# Patient Record
Sex: FEMALE | Race: OTHER | HISPANIC OR LATINO | URBAN - METROPOLITAN AREA
[De-identification: names, ages, dates, MRNs, and addresses within clinical notes are randomized per-mention and may not be internally consistent; named-entity substitution may affect disease eponyms.]

---

## 2018-02-13 ENCOUNTER — EMERGENCY (EMERGENCY)
Facility: HOSPITAL | Age: 63
LOS: 1 days | Discharge: HOME | End: 2018-02-13
Attending: EMERGENCY MEDICINE
Payer: COMMERCIAL

## 2018-02-13 VITALS
TEMPERATURE: 98 F | HEART RATE: 73 BPM | DIASTOLIC BLOOD PRESSURE: 83 MMHG | SYSTOLIC BLOOD PRESSURE: 150 MMHG | OXYGEN SATURATION: 99 % | RESPIRATION RATE: 18 BRPM

## 2018-02-13 VITALS — HEIGHT: 66 IN | WEIGHT: 160.06 LBS

## 2018-02-13 DIAGNOSIS — Z98.89 OTHER SPECIFIED POSTPROCEDURAL STATES: Chronic | ICD-10-CM

## 2018-02-13 DIAGNOSIS — Z90.49 ACQUIRED ABSENCE OF OTHER SPECIFIED PARTS OF DIGESTIVE TRACT: Chronic | ICD-10-CM

## 2018-02-13 PROCEDURE — 73562 X-RAY EXAM OF KNEE 3: CPT | Mod: 26,RT

## 2018-02-13 PROCEDURE — T1013: CPT

## 2018-02-13 PROCEDURE — 73562 X-RAY EXAM OF KNEE 3: CPT

## 2018-02-13 PROCEDURE — 99284 EMERGENCY DEPT VISIT MOD MDM: CPT

## 2018-02-13 PROCEDURE — 93971 EXTREMITY STUDY: CPT

## 2018-02-13 PROCEDURE — 99284 EMERGENCY DEPT VISIT MOD MDM: CPT | Mod: 25

## 2018-02-13 PROCEDURE — 93971 EXTREMITY STUDY: CPT | Mod: 26,RT

## 2018-02-13 RX ORDER — IBUPROFEN 200 MG
800 TABLET ORAL ONCE
Qty: 0 | Refills: 0 | Status: COMPLETED | OUTPATIENT
Start: 2018-02-13 | End: 2018-02-13

## 2018-02-13 RX ORDER — IBUPROFEN 200 MG
1 TABLET ORAL
Qty: 20 | Refills: 0 | OUTPATIENT
Start: 2018-02-13 | End: 2018-02-17

## 2018-02-13 RX ORDER — CYCLOBENZAPRINE HYDROCHLORIDE 10 MG/1
1 TABLET, FILM COATED ORAL
Qty: 10 | Refills: 0 | OUTPATIENT
Start: 2018-02-13 | End: 2018-02-17

## 2018-02-13 RX ADMIN — Medication 800 MILLIGRAM(S): at 08:53

## 2018-02-13 NOTE — ED STATDOCS - OBJECTIVE STATEMENT
63 y/o F with a PSHx of cholecystectomy and no significant PMHx, presents to the ED c/o R knee pain for 2 days. Pt has increased pain with ambulation and ROM. She denies fall, trauma, and heavy lifting. Pt says she is mostly sitting at work and denies lots of standing and walking throughout the day. Has had similar episodes in the past. Pt has seen her doctor who put her on anti-inflammatory medication without any testing for arthritis (?). Denies CP, SOB, nausea, vomiting, fever, chill, and rash. No other acute complaints at this time.

## 2018-02-13 NOTE — ED STATDOCS - NS_ ATTENDINGSCRIBEDETAILS _ED_A_ED_FT
IThe history, relevant review of systems, past medical and surgical history, medical decision making, and physical examination was documented by the scribe in my presence and I attest to the accuracy of the documentation.

## 2018-02-13 NOTE — ED STATDOCS - CONDUCTED A DETAILED DISCUSSION WITH PATIENT AND/OR GUARDIAN REGARDING, MDM
need for outpatient follow-up/radiology results/return to ED if symptoms worsen, persist or questions arise/us and xr/lab results

## 2018-03-28 ENCOUNTER — OTHER (OUTPATIENT)
Age: 63
End: 2018-03-28

## 2018-03-28 PROBLEM — Z00.00 ENCOUNTER FOR PREVENTIVE HEALTH EXAMINATION: Status: ACTIVE | Noted: 2018-03-28

## 2018-04-18 ENCOUNTER — OTHER (OUTPATIENT)
Age: 63
End: 2018-04-18

## 2018-04-18 DIAGNOSIS — M25.569 PAIN IN UNSPECIFIED KNEE: ICD-10-CM

## 2018-04-26 ENCOUNTER — APPOINTMENT (OUTPATIENT)
Dept: ORTHOPEDIC SURGERY | Facility: CLINIC | Age: 63
End: 2018-04-26

## 2019-11-08 ENCOUNTER — MED ADMIN CHARGE (OUTPATIENT)
Age: 64
End: 2019-11-08

## 2019-11-08 ENCOUNTER — LABORATORY RESULT (OUTPATIENT)
Age: 64
End: 2019-11-08

## 2019-11-08 ENCOUNTER — APPOINTMENT (OUTPATIENT)
Dept: FAMILY MEDICINE | Facility: CLINIC | Age: 64
End: 2019-11-08
Payer: MEDICARE

## 2019-11-08 VITALS
HEART RATE: 74 BPM | HEIGHT: 63 IN | OXYGEN SATURATION: 98 % | BODY MASS INDEX: 32.07 KG/M2 | WEIGHT: 181 LBS | DIASTOLIC BLOOD PRESSURE: 88 MMHG | TEMPERATURE: 98 F | SYSTOLIC BLOOD PRESSURE: 150 MMHG

## 2019-11-08 DIAGNOSIS — Z76.89 PERSONS ENCOUNTERING HEALTH SERVICES IN OTHER SPECIFIED CIRCUMSTANCES: ICD-10-CM

## 2019-11-08 DIAGNOSIS — Z78.9 OTHER SPECIFIED HEALTH STATUS: ICD-10-CM

## 2019-11-08 DIAGNOSIS — Z00.00 ENCOUNTER FOR GENERAL ADULT MEDICAL EXAMINATION W/OUT ABNORMAL FINDINGS: ICD-10-CM

## 2019-11-08 PROCEDURE — G0402 INITIAL PREVENTIVE EXAM: CPT

## 2019-11-08 PROCEDURE — 36415 COLL VENOUS BLD VENIPUNCTURE: CPT

## 2019-11-08 PROCEDURE — 90472 IMMUNIZATION ADMIN EACH ADD: CPT

## 2019-11-08 PROCEDURE — G0009: CPT

## 2019-11-08 PROCEDURE — 90732 PPSV23 VACC 2 YRS+ SUBQ/IM: CPT

## 2019-11-08 PROCEDURE — 90662 IIV NO PRSV INCREASED AG IM: CPT

## 2019-11-08 NOTE — REVIEW OF SYSTEMS
[Nail Changes] : nail changes [Patient Intake Form Reviewed] : Patient intake form was reviewed [Hair Changes] : hair changes

## 2019-11-08 NOTE — PHYSICAL EXAM
[Well Nourished] : well nourished [No Acute Distress] : no acute distress [Well Developed] : well developed [Well-Appearing] : well-appearing [Normal Sclera/Conjunctiva] : normal sclera/conjunctiva [PERRL] : pupils equal round and reactive to light [EOMI] : extraocular movements intact [Normal Outer Ear/Nose] : the outer ears and nose were normal in appearance [No JVD] : no jugular venous distention [Normal Oropharynx] : the oropharynx was normal [No Lymphadenopathy] : no lymphadenopathy [Supple] : supple [Thyroid Normal, No Nodules] : the thyroid was normal and there were no nodules present [No Accessory Muscle Use] : no accessory muscle use [No Respiratory Distress] : no respiratory distress  [Clear to Auscultation] : lungs were clear to auscultation bilaterally [Normal S1, S2] : normal S1 and S2 [Normal Rate] : normal rate  [Regular Rhythm] : with a regular rhythm [No Murmur] : no murmur heard [No Carotid Bruits] : no carotid bruits [No Varicosities] : no varicosities [Pedal Pulses Present] : the pedal pulses are present [No Abdominal Bruit] : a ~M bruit was not heard ~T in the abdomen [No Palpable Aorta] : no palpable aorta [No Edema] : there was no peripheral edema [Non Tender] : non-tender [Soft] : abdomen soft [No Extremity Clubbing/Cyanosis] : no extremity clubbing/cyanosis [No Masses] : no abdominal mass palpated [No HSM] : no HSM [Non-distended] : non-distended [Normal Bowel Sounds] : normal bowel sounds [Normal Posterior Cervical Nodes] : no posterior cervical lymphadenopathy [Normal Anterior Cervical Nodes] : no anterior cervical lymphadenopathy [No CVA Tenderness] : no CVA  tenderness [Grossly Normal Strength/Tone] : grossly normal strength/tone [No Joint Swelling] : no joint swelling [No Spinal Tenderness] : no spinal tenderness [No Focal Deficits] : no focal deficits [No Rash] : no rash [Coordination Grossly Intact] : coordination grossly intact [Deep Tendon Reflexes (DTR)] : deep tendon reflexes were 2+ and symmetric [Normal Affect] : the affect was normal [Normal Gait] : normal gait [Normal Insight/Judgement] : insight and judgment were intact

## 2019-11-08 NOTE — COUNSELING
[Behavioral health counseling provided] : Behavioral health counseling provided [Fall prevention counseling provided] : Fall prevention counseling provided [Good understanding] : Patient has a good understanding of lifestyle changes and steps needed to achieve self management goal [None] : None

## 2019-11-08 NOTE — HEALTH RISK ASSESSMENT
[Good] : ~his/her~  mood as  good [No] : In the past 12 months have you used drugs other than those required for medical reasons? No [No falls in past year] : Patient reported no falls in the past year [0] : 2) Feeling down, depressed, or hopeless: Not at all (0) [HIV test declined] : HIV test declined [Hepatitis C test declined] : Hepatitis C test declined [Language] : difficulty with language [Reasoning] : difficulty with reasoning [None] : None [With Family] : lives with family [Employed] : employed [] :  [# Of Children ___] : has [unfilled] children [Fully functional (bathing, dressing, toileting, transferring, walking, feeding)] : Fully functional (bathing, dressing, toileting, transferring, walking, feeding) [Feels Safe at Home] : Feels safe at home [Fully functional (using the telephone, shopping, preparing meals, housekeeping, doing laundry, using] : Fully functional and needs no help or supervision to perform IADLs (using the telephone, shopping, preparing meals, housekeeping, doing laundry, using transportation, managing medications and managing finances) [Smoke Detector] : smoke detector [Seat Belt] :  uses seat belt [With Patient/Caregiver] : With Patient/Caregiver [Designated Healthcare Proxy] : Designated healthcare proxy [Name: ___] : Health Care Proxy's Name: [unfilled]  [Relationship: ___] : Relationship: [unfilled] [Handling Complex Tasks] : difficulty handling complex tasks [Learning/Retaining New Information] : difficulty learning/retaining new information [] : No [Change in mental status noted] : No change in mental status noted [Spatial Ability and Orientation] : denies difficulty with spatial ability and orientation [Behavior] : denies difficulty with behavior [Sexually Active] : not sexually active [Reports changes in hearing] : Reports no changes in hearing [Reports changes in vision] : Reports no changes in vision [Reports changes in dental health] : Reports no changes in dental health [MammogramDate] : ? [PapSmearDate] : ? 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 [AdvancecareDate] : 11/8/19

## 2019-11-08 NOTE — HISTORY OF PRESENT ILLNESS
[de-identified] : 63 y/o HF originally from [facility] presents today to establish anew PCP.\par Pt with no significant past medical hx. Has multiple concerns: Toenail chnages, itchy mole in back.\par \par Previous PCP: Dr Nath, Dr Serna. [FreeTextEntry1] : Establish a new PCP

## 2019-11-08 NOTE — ASSESSMENT
[FreeTextEntry1] : 63 y/o HF presents today to establish anew PCP:\par \par HCM:\par -blood and UA today\par -HIV/HC refused\par \par Mammo; referral\par \par Colonoscopy: referral\par \par Bone scan: referral\par \par Gyn: referral\par \par Immunizations:\par -Flu and Pneumococcal today\par \par Nevus in back:\par -Derm eval\par \par Onychomycosis:\par -LFT's before Rx med.

## 2019-11-11 LAB
25(OH)D3 SERPL-MCNC: 17.4 NG/ML
ALBUMIN SERPL ELPH-MCNC: 4.5 G/DL
ALP BLD-CCNC: 102 U/L
ALT SERPL-CCNC: 38 U/L
ANION GAP SERPL CALC-SCNC: 13 MMOL/L
APPEARANCE: CLEAR
AST SERPL-CCNC: 29 U/L
BASOPHILS # BLD AUTO: 0.06 K/UL
BASOPHILS NFR BLD AUTO: 0.7 %
BILIRUB SERPL-MCNC: 0.3 MG/DL
BILIRUBIN URINE: NEGATIVE
BLOOD URINE: NEGATIVE
BUN SERPL-MCNC: 17 MG/DL
CALCIUM SERPL-MCNC: 10 MG/DL
CHLORIDE SERPL-SCNC: 105 MMOL/L
CHOLEST SERPL-MCNC: 214 MG/DL
CHOLEST/HDLC SERPL: 4.3 RATIO
CO2 SERPL-SCNC: 24 MMOL/L
COLOR: YELLOW
CREAT SERPL-MCNC: 0.77 MG/DL
EOSINOPHIL # BLD AUTO: 0.12 K/UL
EOSINOPHIL NFR BLD AUTO: 1.4 %
GLUCOSE QUALITATIVE U: NEGATIVE
GLUCOSE SERPL-MCNC: 100 MG/DL
HCT VFR BLD CALC: 44.3 %
HDLC SERPL-MCNC: 50 MG/DL
HGB BLD-MCNC: 14.3 G/DL
IMM GRANULOCYTES NFR BLD AUTO: 0.3 %
KETONES URINE: NEGATIVE
LDLC SERPL CALC-MCNC: 123 MG/DL
LEUKOCYTE ESTERASE URINE: ABNORMAL
LYMPHOCYTES # BLD AUTO: 2.25 K/UL
LYMPHOCYTES NFR BLD AUTO: 25.7 %
MAN DIFF?: NORMAL
MCHC RBC-ENTMCNC: 28.4 PG
MCHC RBC-ENTMCNC: 32.3 GM/DL
MCV RBC AUTO: 88.1 FL
MONOCYTES # BLD AUTO: 0.7 K/UL
MONOCYTES NFR BLD AUTO: 8 %
NEUTROPHILS # BLD AUTO: 5.58 K/UL
NEUTROPHILS NFR BLD AUTO: 63.9 %
NITRITE URINE: NEGATIVE
PH URINE: 6
PLATELET # BLD AUTO: 265 K/UL
POTASSIUM SERPL-SCNC: 4.2 MMOL/L
PROT SERPL-MCNC: 7 G/DL
PROTEIN URINE: NORMAL
RBC # BLD: 5.03 M/UL
RBC # FLD: 12.7 %
SODIUM SERPL-SCNC: 142 MMOL/L
SPECIFIC GRAVITY URINE: 1.03
TRIGL SERPL-MCNC: 206 MG/DL
TSH SERPL-ACNC: 2.39 UIU/ML
UROBILINOGEN URINE: NORMAL
WBC # FLD AUTO: 8.74 K/UL

## 2019-11-27 ENCOUNTER — APPOINTMENT (OUTPATIENT)
Dept: RADIOLOGY | Facility: CLINIC | Age: 64
End: 2019-11-27

## 2019-12-27 ENCOUNTER — APPOINTMENT (OUTPATIENT)
Dept: GASTROENTEROLOGY | Facility: CLINIC | Age: 64
End: 2019-12-27

## 2020-01-23 ENCOUNTER — RESULT REVIEW (OUTPATIENT)
Age: 65
End: 2020-01-23

## 2020-01-23 ENCOUNTER — APPOINTMENT (OUTPATIENT)
Dept: DERMATOLOGY | Facility: CLINIC | Age: 65
End: 2020-01-23
Payer: MEDICARE

## 2020-01-23 PROCEDURE — 11102 TANGNTL BX SKIN SINGLE LES: CPT

## 2020-01-23 PROCEDURE — 99202 OFFICE O/P NEW SF 15 MIN: CPT | Mod: 25

## 2021-06-07 ENCOUNTER — EMERGENCY (EMERGENCY)
Facility: HOSPITAL | Age: 66
LOS: 1 days | Discharge: HOME | End: 2021-06-07
Attending: EMERGENCY MEDICINE
Payer: COMMERCIAL

## 2021-06-07 VITALS
WEIGHT: 160.06 LBS | RESPIRATION RATE: 18 BRPM | OXYGEN SATURATION: 99 % | HEIGHT: 66 IN | TEMPERATURE: 98 F | DIASTOLIC BLOOD PRESSURE: 93 MMHG | SYSTOLIC BLOOD PRESSURE: 165 MMHG | HEART RATE: 85 BPM

## 2021-06-07 DIAGNOSIS — Z90.49 ACQUIRED ABSENCE OF OTHER SPECIFIED PARTS OF DIGESTIVE TRACT: Chronic | ICD-10-CM

## 2021-06-07 DIAGNOSIS — Z98.89 OTHER SPECIFIED POSTPROCEDURAL STATES: Chronic | ICD-10-CM

## 2021-06-07 PROCEDURE — 99283 EMERGENCY DEPT VISIT LOW MDM: CPT | Mod: 25

## 2021-06-07 PROCEDURE — 99284 EMERGENCY DEPT VISIT MOD MDM: CPT

## 2021-06-07 PROCEDURE — 73030 X-RAY EXAM OF SHOULDER: CPT

## 2021-06-07 PROCEDURE — 73030 X-RAY EXAM OF SHOULDER: CPT | Mod: 26,LT

## 2021-06-07 RX ORDER — IBUPROFEN 200 MG
600 TABLET ORAL ONCE
Refills: 0 | Status: COMPLETED | OUTPATIENT
Start: 2021-06-07 | End: 2021-06-07

## 2021-06-07 RX ORDER — METHOCARBAMOL 500 MG/1
1500 TABLET, FILM COATED ORAL ONCE
Refills: 0 | Status: COMPLETED | OUTPATIENT
Start: 2021-06-07 | End: 2021-06-07

## 2021-06-07 RX ORDER — METHOCARBAMOL 500 MG/1
1 TABLET, FILM COATED ORAL
Qty: 10 | Refills: 0
Start: 2021-06-07 | End: 2021-06-11

## 2021-06-07 RX ORDER — IBUPROFEN 200 MG
1 TABLET ORAL
Qty: 12 | Refills: 0
Start: 2021-06-07 | End: 2021-06-09

## 2021-06-07 RX ADMIN — Medication 600 MILLIGRAM(S): at 12:59

## 2021-06-07 RX ADMIN — METHOCARBAMOL 1500 MILLIGRAM(S): 500 TABLET, FILM COATED ORAL at 12:59

## 2021-06-07 NOTE — ED PROVIDER NOTE - CLINICAL SUMMARY MEDICAL DECISION MAKING FREE TEXT BOX
shoulder pain  no sings of infection, likely musculoskeletal  x-ray , analgesics , follow up ortho    Pt walked out of ED prior to receiving results and d/c instructions

## 2021-06-07 NOTE — ED PROVIDER NOTE - OBJECTIVE STATEMENT
66 y/o female presents c/o left shoulder pain x one month. PT reports she received the 2nd covid vaccine one month ago. She reports pain to the left shoulder area that is worse with movement of the left shoulder. Denies any known trauma.

## 2021-06-07 NOTE — ED PROVIDER NOTE - CARE PROVIDER_API CALL
Rodrigo Bland)  Orthopaedic Surgery  [address] [place] [address]  Phone: [phone]  Fax: [phone]  Follow Up Time:

## 2021-06-07 NOTE — ED PROVIDER NOTE - NSFOLLOWUPINSTRUCTIONS_ED_ALL_ED_FT
Sprain    A sprain is a stretch or tear in one of the tough, fiber-like tissues (ligaments) in your body. This is caused by an injury to the area such as a twisting mechanism. Symptoms include pain, swelling, or bruising. Rest that area over the next several days and slowly resume activity when tolerated. Ice can help with swelling and pain.     SEEK IMMEDIATE MEDICAL CARE IF YOU HAVE ANY OF THE FOLLOWING SYMPTOMS: worsening pain, inability to move that body part, numbness or tingling.     Please follow up with orthopedics within one week.

## 2021-06-07 NOTE — ED PROVIDER NOTE - PHYSICAL EXAMINATION
Left shoulder: + mild tenderness over lateal shoulder, no erythema, no swelling, no induration, + FROM of shoulder with pain with ROM, NVI distally

## 2021-07-19 ENCOUNTER — TRANSCRIPTION ENCOUNTER (OUTPATIENT)
Age: 66
End: 2021-07-19

## 2021-07-28 ENCOUNTER — APPOINTMENT (OUTPATIENT)
Dept: FAMILY MEDICINE | Facility: CLINIC | Age: 66
End: 2021-07-28

## 2021-07-29 ENCOUNTER — APPOINTMENT (OUTPATIENT)
Dept: ORTHOPEDIC SURGERY | Facility: CLINIC | Age: 66
End: 2021-07-29
Payer: MEDICARE

## 2021-07-29 VITALS
HEIGHT: 63 IN | BODY MASS INDEX: 32.78 KG/M2 | DIASTOLIC BLOOD PRESSURE: 83 MMHG | WEIGHT: 185 LBS | HEART RATE: 81 BPM | SYSTOLIC BLOOD PRESSURE: 161 MMHG

## 2021-07-29 DIAGNOSIS — M75.42 IMPINGEMENT SYNDROME OF LEFT SHOULDER: ICD-10-CM

## 2021-07-29 PROCEDURE — 99204 OFFICE O/P NEW MOD 45 MIN: CPT

## 2021-07-29 PROCEDURE — 99214 OFFICE O/P EST MOD 30 MIN: CPT

## 2021-07-29 PROCEDURE — 73030 X-RAY EXAM OF SHOULDER: CPT | Mod: LT

## 2021-07-29 RX ORDER — MELOXICAM 15 MG/1
15 TABLET ORAL
Qty: 30 | Refills: 1 | Status: ACTIVE | COMMUNITY
Start: 2021-07-29 | End: 1900-01-01

## 2021-07-30 RX ORDER — PREDNISONE 50 MG/1
50 TABLET ORAL
Qty: 5 | Refills: 0 | Status: ACTIVE | COMMUNITY
Start: 2021-07-19

## 2021-07-30 NOTE — HISTORY OF PRESENT ILLNESS
[Pain Location] : pain [] : left shoulder [Worsening] : worsening [___ mths] : [unfilled] month(s) ago [de-identified] : Patient is a RHD female, who presents with c/o left shoulder pain for 2 months Post Covid-Vaccine. Most of the pain is located at lateral aspect of the shoulder. Patient describes the pain as sharp/throbbing/aching.  Patient reports she is unable to sleep on the left side. The shoulder aches at night and wakes up from sleep. There is pain with certain ROM, but patient denies loss of ROM.  Patient has pain donning a jacket.  There is pain when reaching above the head, for example, to reach into a cabinet. There has been no trauma. Patient has tried ice/heat, which helps/doesn't help.  Patient has taken Prednisone for pain which she was prescribed for 5 days when she went to Urgent Care on 7/19/21. The prednisone has helped. Patient denies/reports similar sxs in the past. Patient denies any weakness, tingling, numbness, neck pain.\par

## 2021-07-30 NOTE — DISCUSSION/SUMMARY
[de-identified] : X-rays were reviewed with patient.\par Discussed the pathophysiology of impingement syndrome.  Discussed the role of HEP, PT, subacromial steroid injection.  Patient was instructed on HEP, use of tension bands, importance of ROM.\par An Rx for Meloxicam was sent to her pharmacy. An Rx for PT was generated. \par She will f/u in 1 month.\par  Oriented - self; Oriented - place; Oriented - time

## 2021-07-30 NOTE — PHYSICAL EXAM
[Normal] : Gait: normal [UE] : Sensory: Intact in bilateral upper extremities [Rad] : radial 2+ and symmetric bilaterally [de-identified] : Alert & oriented to person place & time\par No acute distress. Normal affect.\par Normocephalic atraumatic\par Extraocular muscles intact \par Normal respiratory rate and effort, no nasal flaring or use of accessory muscles \par Skin warm and well perfused, no edema, no lymphadenopathy\par \par Left Shoulder:\par no deformity, ecchymosis, erythema\par tender to palpation at greater tuberosity\par tender to palpation at proximal biceps tendon\par forward elevation = 170\par abduction = 160\par internal rotation = thumb to T12\par external rotation = 55\par + Neer impingement\par + Perez impingement\par negative Speeds test\par 5/5 power\par FROM at elbow, wrist and hand with 5/5 motor\par \par Right Shoulder:\par no deformity\par nontender to palpation \par forward elevation = 175\par abduction = 180\par internal rotation = T12 \par external rotation = 55\par negative impingement\par negative Speeds test\par 5/5 power\par  [de-identified] : X-rays obtained of the left shoulder reveal mild degenerative change at the AC joint, lateral acromion. Mild degenerative glenohumeral degenerative change. No obvious fractures, dislocations, or osseous lesions seen.\par

## 2021-09-02 ENCOUNTER — APPOINTMENT (OUTPATIENT)
Dept: ORTHOPEDIC SURGERY | Facility: CLINIC | Age: 66
End: 2021-09-02
Payer: MEDICARE

## 2021-09-02 VITALS
HEART RATE: 74 BPM | WEIGHT: 186 LBS | HEIGHT: 63 IN | BODY MASS INDEX: 32.96 KG/M2 | DIASTOLIC BLOOD PRESSURE: 70 MMHG | SYSTOLIC BLOOD PRESSURE: 144 MMHG

## 2021-09-02 PROCEDURE — 99213 OFFICE O/P EST LOW 20 MIN: CPT

## 2021-11-01 ENCOUNTER — APPOINTMENT (OUTPATIENT)
Dept: ORTHOPEDIC SURGERY | Facility: CLINIC | Age: 66
End: 2021-11-01

## 2021-11-01 DIAGNOSIS — M25.512 PAIN IN LEFT SHOULDER: ICD-10-CM

## 2021-11-17 NOTE — ED PROVIDER NOTE - NS ED MD DISPO DISCHARGE
Home
Detail Level: Simple
Additional Notes: Patient consent was obtained to proceed with the visit and recommended plan of care after discussion of all risks and benefits, including the risks of COVID-19 exposure.

## 2022-06-25 ENCOUNTER — NON-APPOINTMENT (OUTPATIENT)
Age: 67
End: 2022-06-25

## 2022-07-15 ENCOUNTER — EMERGENCY (EMERGENCY)
Facility: HOSPITAL | Age: 67
LOS: 1 days | Discharge: HOME | End: 2022-07-15
Attending: EMERGENCY MEDICINE
Payer: MEDICARE

## 2022-07-15 VITALS
HEART RATE: 78 BPM | DIASTOLIC BLOOD PRESSURE: 84 MMHG | WEIGHT: 145.06 LBS | OXYGEN SATURATION: 99 % | RESPIRATION RATE: 16 BRPM | SYSTOLIC BLOOD PRESSURE: 149 MMHG | HEIGHT: 66 IN | TEMPERATURE: 98 F

## 2022-07-15 VITALS
SYSTOLIC BLOOD PRESSURE: 155 MMHG | OXYGEN SATURATION: 99 % | RESPIRATION RATE: 17 BRPM | DIASTOLIC BLOOD PRESSURE: 84 MMHG | HEART RATE: 87 BPM

## 2022-07-15 DIAGNOSIS — Z90.49 ACQUIRED ABSENCE OF OTHER SPECIFIED PARTS OF DIGESTIVE TRACT: Chronic | ICD-10-CM

## 2022-07-15 DIAGNOSIS — Z98.89 OTHER SPECIFIED POSTPROCEDURAL STATES: Chronic | ICD-10-CM

## 2022-07-15 PROCEDURE — 99285 EMERGENCY DEPT VISIT HI MDM: CPT | Mod: 57

## 2022-07-15 PROCEDURE — 24650 CLTX RDL HEAD/NCK FX WO MNPJ: CPT | Mod: 54,RT

## 2022-07-15 PROCEDURE — 93005 ELECTROCARDIOGRAM TRACING: CPT

## 2022-07-15 PROCEDURE — 70450 CT HEAD/BRAIN W/O DYE: CPT | Mod: 26,MA

## 2022-07-15 PROCEDURE — 93010 ELECTROCARDIOGRAM REPORT: CPT

## 2022-07-15 PROCEDURE — 73080 X-RAY EXAM OF ELBOW: CPT

## 2022-07-15 PROCEDURE — 99284 EMERGENCY DEPT VISIT MOD MDM: CPT | Mod: 25

## 2022-07-15 PROCEDURE — 70450 CT HEAD/BRAIN W/O DYE: CPT | Mod: MA

## 2022-07-15 PROCEDURE — 29105 APPLICATION LONG ARM SPLINT: CPT | Mod: RT

## 2022-07-15 PROCEDURE — 73090 X-RAY EXAM OF FOREARM: CPT

## 2022-07-15 PROCEDURE — 73060 X-RAY EXAM OF HUMERUS: CPT

## 2022-07-15 RX ORDER — OXYCODONE HYDROCHLORIDE 5 MG/1
5 TABLET ORAL ONCE
Refills: 0 | Status: DISCONTINUED | OUTPATIENT
Start: 2022-07-15 | End: 2022-07-15

## 2022-07-15 RX ORDER — ONDANSETRON 8 MG/1
4 TABLET, FILM COATED ORAL ONCE
Refills: 0 | Status: DISCONTINUED | OUTPATIENT
Start: 2022-07-15 | End: 2022-07-20

## 2022-07-15 RX ORDER — ACETAMINOPHEN 500 MG
975 TABLET ORAL ONCE
Refills: 0 | Status: COMPLETED | OUTPATIENT
Start: 2022-07-15 | End: 2022-07-15

## 2022-07-15 RX ADMIN — Medication 975 MILLIGRAM(S): at 20:34

## 2022-07-15 RX ADMIN — Medication 975 MILLIGRAM(S): at 21:49

## 2022-07-15 RX ADMIN — OXYCODONE HYDROCHLORIDE 5 MILLIGRAM(S): 5 TABLET ORAL at 23:42

## 2022-07-15 NOTE — ED PROVIDER NOTE - NSFOLLOWUPINSTRUCTIONS_ED_ALL_ED_FT
Please keep splint dry  Follow up with orthopedics within 1 week      Radial Head Fracture    Bones and nerves of the arm, with a close-up showing a type 3 fracture in the radial head.    A radial head fracture is a break in one of the bones of the forearm (radius), near the elbow joint. There are two bones in the forearm. The radius, or radial bone, is the bone on the side of the thumb.    There are different types of radial head fractures. The type is determined by the amount of movement (displacement) of bones from their normal positions:  •Type 1. This is a small fracture in which the bone pieces remain together (nondisplaced fracture).      •Type 2. The fracture is moderate, and bone pieces are slightly displaced.      •Type 3. There are multiple fractures and displaced bone pieces.        What are the causes?    This condition is usually caused by falling and landing on an outstretched arm.      What increases the risk?    You are more likely to develop this condition if you:  •Are an older female.      •Participate in sports that are more likely to cause falls while running or jumping.      •Have weak bones (osteoporosis).        What are the signs or symptoms?    Symptoms of this condition include:  •Swelling of the elbow joint.      •Pain and difficulty when moving the elbow joint, such as when straightening the elbow or rotating the forearm.        How is this diagnosed?    This condition is diagnosed based on your medical history and a physical exam. You may have X-rays to confirm the type of fracture.      How is this treated?    Treatment for this condition includes resting, icing, and raising (elevating) the injured area above the level of your heart. You may be given medicines to help relieve pain.    Treatment varies depending on the type of fracture you have.  •For a type 1 fracture, you may be given a splint or sling to keep your arm and elbow from moving for several days.      •For a type 2 fracture, you may be given a splint or sling to keep your arm and elbow from moving for several days. If the displacement is more severe, you may need surgery.      •For a type 3 fracture, you will usually need surgery to have bone pieces removed. The entire radial head may need to be removed if the damage is severe. The fracture will be stabilized with a splint and sling.        Follow these instructions at home:    Medicines     •Take over-the-counter and prescription medicines only as told by your health care provider.    •Ask your health care provider if the medicine prescribed to you:  •Requires you to avoid driving or using machinery.    •Can cause constipation. You may need to take these actions to prevent or treat constipation:  •Drink enough fluid to keep your urine pale yellow.      •Take over-the-counter or prescription medicines.      •Eat foods that are high in fiber, such as beans, whole grains, and fresh fruits and vegetables.      •Limit foods that are high in fat and processed sugars, such as fried or sweet foods.          If you have a removable splint or sling:     •Wear the splint or sling as told by your health care provider. Remove it only as told by your health care provider.      •Check the skin around the splint or sling every day. Tell your health care provider about any concerns.      •Loosen the splint or sling if your fingers tingle, become numb, or turn cold and blue.      •Keep it clean and dry.      If you have a nonremovable splint:     • Do not put pressure on any part of the splint until it is fully hardened. This may take several hours.      • Do not stick anything inside the splint to scratch your skin. Doing that increases your risk of infection.      •Check the skin around the splint every day. Tell your health care provider about any concerns.      •You may put lotion on dry skin around the edges of the splint. Do not put lotion on the skin underneath the splint.      •Keep it clean and dry.      Bathing     • Do not take baths, swim, or use a hot tub until your health care provider approves. Ask your health care provider if you may take showers. You may only be allowed to take sponge baths.    •If the splint or sling is not waterproof:  •Do not let it get wet.      •Cover it with a watertight covering when you take a bath or shower.          Managing pain, stiffness, and swelling   Bag of ice on a towel on the skin. •If directed, put ice on the injured area. To do this:  •If you have a removable splint or sling, remove it as told by your health care provider.      •Put ice in a plastic bag.      •Place a towel between your skin and the bag or between your splint and the bag.      •Leave the ice on for 20 minutes, 2–3 times a day.      •Remove the ice if your skin turns bright red. This is very important. If you cannot feel pain, heat, or cold, you have a greater risk of damage to the area.        •Move your fingers often to reduce stiffness and swelling.      •Raise (elevate) the injured area above the level of your heart while you are sitting or lying down.      Activity     •Ask your health care provider when it is safe to drive if you have a splint or sling on your arm.      • Do not lift anything that is heavier than 10 lb (4.5 kg), or the limit that you are told, until your health care provider says that it is safe.      •Return to your normal activities as told by your health care provider. Ask your health care provider what activities are safe for you.      •Do exercises as told by your health care provider or physical therapist.      General instructions     • Do not use any products that contain nicotine or tobacco. These products include cigarettes, chewing tobacco, and vaping devices, such as e-cigarettes. If you need help quitting, ask your health care provider.      •Keep all follow-up visits. This is important.        Contact a health care provider if:    •You have problems with your splint.      •You have pain or swelling that gets worse.        Get help right away if:    •You have severe pain, especially if the pain changes significantly or suddenly.      •You have fluid or a bad smell coming from your splint.      •Your hand or fingers get cold or turn pale or blue.      •You lose feeling in any part of your hand or arm.        Summary    •A radial head fracture is a break in one of the bones in your forearm (radius), near the elbow joint.      •This condition is usually caused by falling and landing on an outstretched arm.      •This condition may be treated with rest, ice, elevation, pain medicines, a splint or sling, and surgery. Treatment will vary depending on the type of fracture you have.      This information is not intended to replace advice given to you by your health care provider. Make sure you discuss any questions you have with your health care provider.

## 2022-07-15 NOTE — ED PROVIDER NOTE - ATTENDING APP SHARED VISIT CONTRIBUTION OF CARE
I, Tariq Carrera, have personally performed a face to face diagnostic evaluation on this patient. I have reviewed the JODY note and agree with the history, exam and plan of care, except as noted.    65 yo F p/w mechanical fall on right arm in the bathroom. no head injury. right elbow swollen. held in flexed position. limited ROM secondary to pain. 2+ peripheral pulse. < 2 sec cap refill. xray showed radial head fracture. splint applied by PA. ortho follow up.

## 2022-07-15 NOTE — ED PROVIDER NOTE - PHYSICAL EXAMINATION
Gen: No acute distress, non toxic  HEENT: Mucous membranes moist, pink conjunctivae, PERRL, EOMI  CV: RRR, nl s1/s2.  Resp: CTAB, normal rate and effort  GI: Abdomen soft, NT, ND. No rebound, no guarding  : No CVAT  Neuro: A&O x 3, CNII-XII grossly intact, moving all 4 extremities  MSK: No midline spine tenderness to palpation. +Tenderness right elbow, limited ROM, no ttp R shoulder, wrist or hand. sensation intact throughout, 2+ distal pulses  Skin: No rashes. intact and perfused.

## 2022-07-15 NOTE — ED ADULT NURSE NOTE - NS ED NURSE LEVEL OF CONSCIOUSNESS MENTAL STATUS
[address] [facility]  DVT Prophylaxis and Vaccine Status  Work List  Mandatory for all patients      Patient must be on both Chemical prophylaxis and Mechanical prophylaxis.  If chemical/mechanical prophylaxis is not ordered, the physician must document a reason for not using prophylaxis     Chemical Prophylaxis  Is patient on chemical prophylaxis: Yes  If no chemical prophylaxis Is a order in for No Chemical VTE prophylaxisNo  If no was the physician notified not applicable      Mechanical Prophylaxis  Is patient on mechanical prophylaxis, intermittent pneumatic compression device: Yes  If no was the physician notified not applicable        Pneumonia Vaccine  Vaccine indicated:  Vaccination refused  If indicated was the vaccine given: not applicable    Influenza Vaccine (applicable from October through March):  Vaccine indicated: Up-to-date  If indicated was the vaccine given: not applicable    Patient Education  Education completed on DVT prophylaxis: yes Awake/Alert/Cooperative

## 2022-07-15 NOTE — ED ADULT NURSE NOTE - ADDITIONAL LOCATION
"Kuldip Castaneda is a 42 y.o. male who presents for  evaluation of   Chief Complaint   Patient presents with   • Diabetes       Referring provider    No referring provider defined for this encounter.    Primary Care Provider    Aura Carrillo MD    Duration 5 years    Timing - Diabetes is Constant    Quality -  Glucose elevated    Severity -  high    Complications - none    Current symptoms/problems  Polyuria, weakness    Alleviating Factors: Compliance      Side Effects  none    Current diet  in general, a \"healthy\" diet      Current exercise tired ,not exercising     Current monitoring regimen: home blood tests - 3 times daily  Home blood sugar records: 200 to 300    Hypoglycemia none    Past Medical History:   Diagnosis Date   • Anxiety    • Biliary dyskinesia    • Blood in feces         • Chest pain    • Chronic cholecystitis without calculus     postoperative      • Depressive disorder    • Epigastric pain    • Essential hypertension    • Gastro-esophageal reflux disease with esophagitis    • Generalized anxiety disorder    • Genital warts     large left groin      • Glaucoma suspect     suspicious disc cupping      • Hashimoto's thyroiditis    • Hematochezia    • History of echocardiogram 01/15/2016    Mild concentric LV hypertrophy with normal left atrial and aortic root size. LV systolic function is overall well preserved with EF 55-60%. Mitral valve mildly thickened with adequate opening.   • Hypercholesterolemia    • Hyperlipemia    • Hypertensive disorder    • Lipoma of anterior chest wall     left   • Major depressive disorder    • Microscopic hematuria    • Morbid obesity (CMS/HCC)    • Multiple acquired skin tags     in groin   • Nausea and vomiting    • Obesity    • Pain in pelvis    • Painful urging to urinate    • Panic disorder    • Psychiatric illness    • Psychosis    • Right upper quadrant pain    • Schizoaffective disorder (CMS/HCC)    • Transient visual loss     resolved, prob BS " "elevation      • Type 2 diabetes mellitus (CMS/HCC)     not on medications at this time    • Visual disturbance    • Vitamin D deficiency      Family History   Problem Relation Age of Onset   • Depression Mother    • Schizophrenia Father         or Bipolar Disorder, admitted to [facility]   • Heart attack Father      Social History   Substance Use Topics   • Smoking status: Former Smoker   • Smokeless tobacco: Never Used   • Alcohol use No      Comment: Tried cocaine, crack, meth, thc all when he was young at parties.  Nothing in about 2 decades         Current Outpatient Prescriptions:   •  ALPRAZolam (XANAX) 2 MG tablet, Take 1 tablet by mouth At Night As Needed for Anxiety., Disp: 30 tablet, Rfl: 2  •  atorvastatin (LIPITOR) 20 MG tablet, Take 1 tablet by mouth Every Night., Disp: 30 tablet, Rfl: 5  •  B-D 3CC LUER-JUAN DIEGO SYR 25GX1/2\" 25G X 1-1/2\" 3 ML misc, Inject 1 mL into the shoulder, thigh, or buttocks Every 30 (Thirty) Days., Disp: 1 each, Rfl: 5  •  cyanocobalamin 1000 MCG/ML injection, Inject 1 ml (1,000 mcg) intramuscularly (into the muscle) every 14 days., Disp: 2 mL, Rfl: 3  •  EPINEPHrine (EPIPEN 2-SWATHI) 0.3 MG/0.3ML solution auto-injector injection, As dir., Disp: 1 each, Rfl: 2  •  esomeprazole (NEXIUM) 40 MG capsule, Take 1 capsule by mouth Every Morning Before Breakfast., Disp: 30 capsule, Rfl: 5  •  hydrochlorothiazide (HYDRODIURIL) 25 MG tablet, Take 1 tablet by mouth Daily., Disp: 30 tablet, Rfl: 5  •  hydrOXYzine (VISTARIL) 50 MG capsule, Take 1 capsule by mouth 4 (Four) Times a Day As Needed for Itching or Anxiety., Disp: 120 capsule, Rfl: 5  •  Insulin Glargine (TOUJEO MAX SOLOSTAR) 300 UNIT/ML solution pen-injector, Inject 75 Units under the skin into the appropriate area as directed every morning., Disp: 9 mL, Rfl: 11  •  Insulin Lispro (HUMALOG KWIKPEN) 200 UNIT/ML solution pen-injector, Inject 5 units per 15 grams of carbohydrate up to 30 units under the skin 3 (three) times a day with " "meals., Disp: 12 mL, Rfl: 11  •  Insulin Pen Needle 31G X 6 MM misc, Use to inject insulin 4 (four) times a day., Disp: 120 each, Rfl: 11  •  SYNTHROID 50 MCG tablet, Take 1 tablet by mouth Every Morning., Disp: 30 tablet, Rfl: 5  •  Syringe/Needle, Disp, (B-D SYRINGE/NEEDLE 1CC/25GX5/8) 25G X 5/8\" 1 ML misc, FOR USE WITH VIT B-12 SHOT, Disp: 2 each, Rfl: 3  •  vitamin D (ERGOCALCIFEROL) 84478 units capsule capsule, Take 1 capsule by mouth 2 (Two) Times a Week., Disp: 8 capsule, Rfl: 5    Review of Systems    Review of Systems   Constitutional: Negative for activity change, appetite change, chills, diaphoresis, fatigue, fever and unexpected weight change.   HENT: Negative for congestion, dental problem, drooling, ear discharge, ear pain, facial swelling, mouth sores, postnasal drip, rhinorrhea, sinus pressure, sore throat, tinnitus, trouble swallowing and voice change.    Eyes: Negative for photophobia, pain, discharge, redness, itching and visual disturbance.   Respiratory: Negative for apnea, cough, choking, chest tightness, shortness of breath, wheezing and stridor.    Cardiovascular: Negative for chest pain, palpitations and leg swelling.   Gastrointestinal: Negative for abdominal distention, abdominal pain, constipation, diarrhea, nausea and vomiting.   Endocrine: Negative for cold intolerance, heat intolerance, polydipsia, polyphagia and polyuria.   Genitourinary: Negative for decreased urine volume, difficulty urinating, dysuria, flank pain, frequency, hematuria and urgency.   Musculoskeletal: Negative for arthralgias, back pain, gait problem, joint swelling, myalgias, neck pain and neck stiffness.   Skin: Negative for color change, pallor, rash and wound.   Allergic/Immunologic: Negative for immunocompromised state.   Neurological: Negative for dizziness, tremors, seizures, syncope, facial asymmetry, speech difficulty, weakness, light-headedness, numbness and headaches.   Hematological: Negative for " "adenopathy.   Psychiatric/Behavioral: Negative for agitation, behavioral problems, confusion, decreased concentration, dysphoric mood, hallucinations, self-injury, sleep disturbance and suicidal ideas. The patient is not nervous/anxious and is not hyperactive.         Objective:   /80   Pulse 87   Ht 182.9 cm (72\")   Wt (!) 139 kg (306 lb 3.2 oz)   SpO2 96%   BMI 41.53 kg/m²     Physical Exam   Constitutional: He is oriented to person, place, and time. He appears well-developed and well-nourished. He is cooperative.   HENT:   Head: Normocephalic and atraumatic.   Right Ear: External ear normal.   Left Ear: External ear normal.   Nose: Nose normal.   Mouth/Throat: Oropharynx is clear and moist. No oropharyngeal exudate.   Eyes: Pupils are equal, round, and reactive to light. Conjunctivae and EOM are normal. No scleral icterus. Right eye exhibits normal extraocular motion. Left eye exhibits normal extraocular motion.   Neck: Neck supple. No JVD present. No muscular tenderness present. No tracheal deviation, no edema and no erythema present. No thyromegaly present.   Cardiovascular: Normal rate, regular rhythm, normal heart sounds and intact distal pulses.  Exam reveals no gallop and no friction rub.    No murmur heard.  Pulmonary/Chest: Effort normal and breath sounds normal. No stridor. No respiratory distress. He has no decreased breath sounds. He has no wheezes. He has no rhonchi. He has no rales. He exhibits no tenderness.   Abdominal: Soft. Bowel sounds are normal. He exhibits no distension and no mass. There is no hepatomegaly. There is no tenderness. There is no rebound and no guarding. No hernia.   Musculoskeletal: Normal range of motion. He exhibits no edema, tenderness or deformity.   Lymphadenopathy:     He has no cervical adenopathy.   Neurological: He is alert and oriented to person, place, and time. He has normal reflexes. No cranial nerve deficit. He exhibits normal muscle tone. Coordination " normal.   Skin: Skin is warm. No rash noted. No erythema. No pallor.   Psychiatric: He has a normal mood and affect. His behavior is normal. Judgment and thought content normal.   Nursing note and vitals reviewed.      Lab Review            Assessment/Plan       ICD-10-CM ICD-9-CM   1. Type 2 diabetes mellitus with complication, unspecified long term insulin use status (CMS/[facility]) E11.8 250.90   2. Hypothyroidism due to Hashimoto's thyroiditis E03.8 244.8    E06.3 245.2   3. Vitamin D deficiency E55.9 268.9   4. Essential hypertension I10 401.9   5. B12 deficiency E53.8 266.2   6. Mixed hyperlipidemia E78.2 272.2   7. Hypogonadism in male E29.1 257.2   8. Depression, unspecified depression type F32.9 311       Glycemic Management:   Lab Results   Component Value Date    HGBA1C 13.8 (H) 08/27/2018    HGBA1C 6.02 (H) 07/12/2017    HGBA1C 6.42 (H) 06/08/2017     Lab Results   Component Value Date    GLUCOSE 225 (H) 08/30/2018    BUN 20 08/30/2018    CREATININE 1.22 08/30/2018    EGFRIFAFRI 79 08/30/2018    BCR 16.4 08/30/2018    K 3.8 08/30/2018    CO2 22.0 08/30/2018    CALCIUM 9.6 08/30/2018    ALBUMIN 4.40 08/29/2018    AST 39 08/29/2018    ALT 45 08/29/2018    ANIONGAP 12.0 08/30/2018     Lab Results   Component Value Date    WBC 3.09 (L) 08/29/2018    HGB 14.4 08/29/2018    HCT 39.5 08/29/2018    MCV 82.5 08/29/2018     08/29/2018       Keep Toujeo 75 units daily   Every week see what the average waking sugar has been    If that number is more than 130 , you can increase toujeo by 5 units up to 150 units   If you ever wake low ( less than 80 ) you back off 10 units and don't go up again       ===============================================      Keep humalog 5 units per 15 grams of carbohydrate     Plus    151-200: 3 units  201-250 : 6 units  251-300 : 9 units  Above 300 : 12 units    Every week you can look back and see if you have been using the sliding scale more than 50% of the time     If you have, then  increase the humalog by 1 = 6 units per 15 grams of carbohydrate , you can keep doing this up to 10 units per 15 grams of carbohydrate       ==================================================    -----------------    Start trulicity 0.75 mg weekly     If nausea , try to eat less, if vomiting, please stop     -----------------    In 2 weeks     Start     xigduo 5/1000 mg tabs    1 w bkfast , after 2 weeks increase to 2 w bkfast         Lipid Management  Lab Results   Component Value Date    CHOL 154 11/22/2017    CHOL 197 07/14/2017    CHOL 193 07/12/2017     Lab Results   Component Value Date    TRIG 105 11/22/2017    TRIG 87 07/14/2017    TRIG 68 07/12/2017     Lab Results   Component Value Date    HDL 35 11/22/2017    HDL 33 (L) 07/14/2017    HDL 31 (L) 07/12/2017     No components found for: LDLCALC  Lab Results   Component Value Date    LDL 98 11/22/2017     (H) 07/14/2017     (H) 07/12/2017     No results found for: LDLDIRECT     On atorvastatin 20 mg qhs before , discuss this next appt      Blood Pressure Management:    Vitals:    09/04/18 1152   BP: 132/80   Pulse: 87   SpO2: 96%     Lab Results   Component Value Date    GLUCOSE 225 (H) 08/30/2018    CALCIUM 9.6 08/30/2018     (L) 08/30/2018    K 3.8 08/30/2018    CO2 22.0 08/30/2018     08/30/2018    BUN 20 08/30/2018    CREATININE 1.22 08/30/2018    EGFRIFAFRI 79 08/30/2018    BCR 16.4 08/30/2018    ANIONGAP 12.0 08/30/2018         Controlled on vasotec in the past   Presently on hctz and at goal       Microvascular Complication Monitoring:          -----------    Last Microalbumin-Proteinuria Assessment    Lab Results   Component Value Date    MALBCRERATIO 54.9 (H) 06/08/2017    MALBCRERATIO 10 05/05/2016       Lab Results   Component Value Date    [facility] 16 05/05/2016    [facility] 45 01/18/2016       -----------      Neuropathy, none       Immunizations:          Preventive Care:      Not smoking       Weight Related:   Wt Readings  from Last 3 Encounters:   09/04/18 (!) 139 kg (306 lb 3.2 oz)   08/30/18 (!) 136 kg (300 lb 6.4 oz)   08/22/18 133 kg (293 lb)     Body mass index is 41.53 kg/m².        Diet interventions: moderate (500 kCal/d) deficit diet.      Bone Health    Lab Results   Component Value Date    CALCIUM 9.6 08/30/2018    PHOS 3.0 08/28/2018    DDJC42ES 44.1 06/08/2017       Thyroid Health    Controlled on 50 mcgs daily     Lab Results   Component Value Date    TSH 3.730 08/29/2018    TSH 4.390 11/22/2017    TSH 3.500 07/11/2017                 Other Diabetes Related Aspects       Lab Results   Component Value Date    [id number] 637 06/08/2017           Last celiac panel     Lab Results   Component Value Date    GDPABIGA 4 01/16/2016    TTRANSGLIGA <2 01/16/2016     01/16/2016     Male Hypogonadism          Lab Results   Component Value Date    TESTFRE 11.6 02/27/2017       Lab Results   Component Value Date    TESTOSTEROTT 538 06/08/2017    TESTOSTEROTT 328 (L) 02/27/2017    TESTOSTEROTT 224 (L) 10/07/2016       Lab Results   Component Value Date    HCT 39.5 08/29/2018    HCT 43.0 08/27/2018    HCT 42.0 11/22/2017       Lab Results   Component Value Date    PSA 0.6 06/08/2017    PSA 0.70 03/10/2015       No results found for: PROLACTIN    No results found for: ESTRADIOL                Was doing 100 mg every 10 days , reevaluate need     No sx of NAVA    No LUTS  Lab Results   Component Value Date    PSA 0.6 06/08/2017    PSA 0.70 03/10/2015       --    Dx of depression but at some point he was given dx of bipolar and schizoaffective , rx olanzapine that he is off of it    We need clarification , refer to psychiatry    I reviewed and summarized records from Aura Carrillo MD from 2017 and I reviewed / ordered labs.     Orders Placed This Encounter   Procedures   • CBC Auto Differential   • Comprehensive Metabolic Panel   • Hemoglobin A1c   • Lipid Panel   • Microalbumin / Creatinine Urine Ratio - Urine, Clean Catch   •  TSH   • Vitamin B12   • Vitamin D 25 Hydroxy   • Testosterone, Bioavailable (M)   • PSA Total, Reflex To Free   • POC Glucose         A copy of my note was sent to Aura Carrillo MD    Please see my above opinion and suggestions.      additional location...

## 2022-07-15 NOTE — ED ADULT NURSE NOTE - OBJECTIVE STATEMENT
Pt is a 66YOF who is here for an injury to her right elbow/arm, pt states she was at home bathing her grandchild, when she was cleaning up, she believes she slipped on the water on the floor and landed on her arm, pt states she "woke up" on the floor, states possible loc, denies any pain anywhere except her arm, no noticeable injury to her head or neck area. No pain on palpation. Pt is a&o4

## 2022-07-15 NOTE — ED PROVIDER NOTE - PATIENT PORTAL LINK FT
You can access the FollowMyHealth Patient Portal offered by [facility] by registering at the following website: [email or web address]. By joining [employer]’s FollowMyHealth portal, you will also be able to view your health information using other applications (apps) compatible with our system.

## 2022-07-15 NOTE — ED PROVIDER NOTE - OBJECTIVE STATEMENT
67 y/o F no pmhx presents c/o fall and right arm pain. pt was in bathroom giving niece a bath, turned and slipped and fell. ?LOC although denies hitting head. c/o right elbow pain worse with movement. denies any headache, n/v/d, numbness, weakness, chest pain, shortness of breath. no blood thinners.

## 2022-07-15 NOTE — ED PROVIDER NOTE - CARE PROVIDER_API CALL
Chip Roa (DO)  Orthopedics  [address] [place] [id number]  Phone: [phone]  Fax: [phone]  Follow Up Time:

## 2022-07-15 NOTE — ED PROVIDER NOTE - CLINICAL SUMMARY MEDICAL DECISION MAKING FREE TEXT BOX
pt with slip and fall with questionable LOC, c/o r elbow pain  denies CP/SOB  ekg, CT brain neg  Xrays +radial head fx, applied splint and shoulder sling and given orthopedic f/u

## 2022-07-15 NOTE — ED PROVIDER NOTE - NS ED ATTENDING STATEMENT MOD
This was a shared visit with the JODY. I reviewed and verified the documentation and independently performed the documented:

## 2022-07-16 PROCEDURE — 73080 X-RAY EXAM OF ELBOW: CPT | Mod: 26,RT

## 2022-07-16 PROCEDURE — 73090 X-RAY EXAM OF FOREARM: CPT | Mod: 26,LT

## 2022-07-16 PROCEDURE — 73060 X-RAY EXAM OF HUMERUS: CPT | Mod: 26,RT

## 2022-07-16 RX ORDER — OXYCODONE HYDROCHLORIDE 5 MG/1
1 TABLET ORAL
Qty: 8 | Refills: 0
Start: 2022-07-16

## 2022-07-16 RX ORDER — IBUPROFEN 200 MG
1 TABLET ORAL
Qty: 20 | Refills: 0
Start: 2022-07-16

## 2022-07-20 ENCOUNTER — APPOINTMENT (OUTPATIENT)
Dept: ORTHOPEDIC SURGERY | Facility: CLINIC | Age: 67
End: 2022-07-20

## 2022-07-20 VITALS
DIASTOLIC BLOOD PRESSURE: 85 MMHG | SYSTOLIC BLOOD PRESSURE: 143 MMHG | WEIGHT: 186 LBS | BODY MASS INDEX: 32.96 KG/M2 | HEIGHT: 63 IN | HEART RATE: 85 BPM

## 2022-07-20 PROCEDURE — 24650 CLTX RDL HEAD/NCK FX WO MNPJ: CPT | Mod: RT

## 2022-07-20 PROCEDURE — 99213 OFFICE O/P EST LOW 20 MIN: CPT | Mod: 57

## 2022-07-28 NOTE — ED ADULT NURSE NOTE - RESPIRATION RHYTHM, QM
Target Date Of Procedure (If Known)?: 07/28/2022 Additional History: Pt interested in botox and fillers\\nDenies h/o cold sores, neuromuscular diseases. regular

## 2022-08-02 ENCOUNTER — APPOINTMENT (OUTPATIENT)
Dept: ORTHOPEDIC SURGERY | Facility: CLINIC | Age: 67
End: 2022-08-02

## 2022-08-02 VITALS
HEART RATE: 75 BPM | DIASTOLIC BLOOD PRESSURE: 80 MMHG | SYSTOLIC BLOOD PRESSURE: 147 MMHG | BODY MASS INDEX: 32.96 KG/M2 | HEIGHT: 63 IN | WEIGHT: 186 LBS

## 2022-08-02 PROCEDURE — 73080 X-RAY EXAM OF ELBOW: CPT | Mod: RT

## 2022-08-02 PROCEDURE — 99024 POSTOP FOLLOW-UP VISIT: CPT

## 2022-08-19 ENCOUNTER — APPOINTMENT (OUTPATIENT)
Dept: ORTHOPEDIC SURGERY | Facility: CLINIC | Age: 67
End: 2022-08-19

## 2022-08-19 VITALS
DIASTOLIC BLOOD PRESSURE: 82 MMHG | HEIGHT: 63 IN | WEIGHT: 186 LBS | HEART RATE: 85 BPM | BODY MASS INDEX: 32.96 KG/M2 | SYSTOLIC BLOOD PRESSURE: 158 MMHG

## 2022-08-19 PROCEDURE — 73080 X-RAY EXAM OF ELBOW: CPT | Mod: RT

## 2022-08-19 PROCEDURE — 99024 POSTOP FOLLOW-UP VISIT: CPT

## 2022-08-19 NOTE — DISCUSSION/SUMMARY
[de-identified] : Assessment: 67-year-old female with right radial head fracture.\par \par Plan: I had a long discussion with the patient today regarding the nature of their diagnosis and treatment plan. We discussed the risks and benefits of no treatment as well as nonoperative and operative treatments.  I reviewed the patient's x-rays  today which revealed a minimally displaced radial head fracture and overall acceptable position, well healing.  At this time I am recommending that she begin formal physical therapy to work on range of motion.  She will avoid any heavy lifting, pushing or pulling with the right upper extremity to prevent further injury and displacement of the fracture.  She will follow-up in 4 weeks for repeat evaluation and we may begin gentle progressive strengthening at that time pending her x-rays.  Patient understands and agrees and all questions were answered.  Patient discussed and reviewed with Dr. Roa today.\par \par The patient verbalizes their understanding and agrees with the plan.  All questions were answered to their satisfaction.

## 2022-08-19 NOTE — HISTORY OF PRESENT ILLNESS
[de-identified] : 08/19/2022 : LEANNA SILVER  is a 67 year year old female who presents to the office for follow-up evaluation of her right elbow today.  She states in general she is feeling much better and has been compliant with her restrictions overall.  She states she still has some swelling and has been working on range of motion on her own and is overall doing much better and has minimal pain.  She states she has some stiffness with flexion and extension but is overall doing well.  She is here for routine follow-up today with her son.  She denies any numbness or tingling distally.  She has no other complaints today.\par \par 08/02/2022 : LEANNA SILVER  is a 66 year year old female who presents to the office for  follow-up valuation of her right elbow.  She states that since last office visit she has had improvement in her pain in her elbow.  She is been compliant with her restrictions in a sling and has avoided any heavy lifting with the right upper extremity.  She is here for routine follow-up today With her son.  She denies any numbness or tingling distally.  She has no other complaints today.\par \par 7/20/22: Leanna is a pleasant 66-year-old right-hand-dominant female who presents to the office today complaining of left shoulder pain for the past 2 to 3 months.  She denies any history of trauma.  Pain was activity related and alleviated by rest.  Hurting her to reach overhead or behind her.  It bothers her when she slept at night.  She saw BLAINE Kelly regarding this issue and she was diagnosed with shoulder impingement.  Conservative treatments were started including anti-inflammatories and physical therapy.  The patient has done very well and has experienced substantial relief over the past several weeks with therapy.  She is now sleeping better at night and she is happy with her progress.  The patient denies any fevers, chills, sweats, recent illnesses, numbness, tingling, weakness, or pain elsewhere at this time.

## 2022-09-22 ENCOUNTER — APPOINTMENT (OUTPATIENT)
Dept: ORTHOPEDIC SURGERY | Facility: CLINIC | Age: 67
End: 2022-09-22

## 2022-10-13 ENCOUNTER — APPOINTMENT (OUTPATIENT)
Dept: ORTHOPEDIC SURGERY | Facility: CLINIC | Age: 67
End: 2022-10-13

## 2022-10-13 VITALS
HEART RATE: 94 BPM | SYSTOLIC BLOOD PRESSURE: 172 MMHG | HEIGHT: 63 IN | DIASTOLIC BLOOD PRESSURE: 84 MMHG | WEIGHT: 186 LBS | BODY MASS INDEX: 32.96 KG/M2

## 2022-10-13 DIAGNOSIS — S52.123A DISPLACED FRACTURE OF HEAD OF UNSPECIFIED RADIUS, INITIAL ENCOUNTER FOR CLOSED FRACTURE: ICD-10-CM

## 2022-10-13 PROCEDURE — 99213 OFFICE O/P EST LOW 20 MIN: CPT | Mod: 24

## 2022-10-13 PROCEDURE — 73080 X-RAY EXAM OF ELBOW: CPT | Mod: RT

## 2022-10-13 NOTE — PHYSICAL EXAM
[de-identified] : General:\par Awake, alert, no acute distress, Patient was cooperative and appropriate during the examination.\par \par The patient is of normal weight for height and age.\par \par Walks without an antalgic gait.\par \par Full, painless range of motion of the neck and back.\par \par Exam of the bilateral lower extremities is intact and symmetric with regards to dermatologic, vascular, and neurologic exam. Bilateral lower extremity sensation is grossly intact to light touch in the DP/SP/T/S/S nerve distributions. Intact DF/PF/EHL. BIlateral lower extremities warm and well-perfused with brisk capillary refill.\par \par \par Pulmonary:\par Regular, nonlabored breathing\par \par Abdomen:\par Soft, nontender, nondistended.\par \par Lymphatic:\par No evidence of axillary lymphadenopathy\par \par Right Elbow Exam:\par Physical exam of the elbow demonstrates normal skin without signs of skin changes or abnormalities. No erythema, warmth, or joint effusion appreciated. \par  \par Sensation intact light touch C5-T1\par Palpable radial pulse\par Radial/ulnar/median/axillary/musculocutaneous/AIN/PIN nerves grossly intact\par  \par Range of motion:\par Flexion-Extension 0-140 without pain\par Pronation-Supination 85-85 without pain\par \par Palpation:\par Not tender to palpation over the lateral epicondyle\par Not tender palpation over the medial epicondyle\par Not tender to palpation over the radial head\par Not tender to palpation over the olecranon\par Not tender to palpation over the distal biceps insertion\par Not tender to palpation over the distal triceps insertion\par Not tender to palpation over the cubital tunnel\par  \par Strength testing:\par Elbow Flexion 5/5\par Elbow Extension 5/5\par Pronation 5/5\par Supination 5/5\par  \par Special Tests:\par No varus/valgus laxity at 0 and 30 degrees of elbow flexion\par No pain with resisted wrist/finger extension and forearm supination\par No pain with resisted wrist/finger flexion and forearm pronation\par Negative hook test\par Negative Tinel's over the carpal and cubital tunnels\par   [de-identified] : X-rays 3 views of the right elbow taken in the office today on 10/13/2022 revealed a radial head fracture in overall acceptable position with abundant callus formation noted, well-healed and no other acute findings.\par \par X-ray 3 views of the right elbow taken in the office today shows a minimally depressed radial head fracture in overall septal position unchanged alignment when compared to previous films with consolidation noted.\par \par X-ray 3 views of the right elbow taken in the office today on 8/2/2022 shows a minimally depressed right radial head fracture in overall acceptable position unchanged alignment when compared to previous films.\par \par X-rays including multiple views of the left shoulder from her previous office visit were reviewed today.  There is no acute fracture or dislocation.  There is mild arthritis of the before meals and glenohumeral joints.

## 2022-10-13 NOTE — HISTORY OF PRESENT ILLNESS
[de-identified] : 10/13/2022 : LEANNA SILVER  is a 67 year  old female who presents to the office for follow-up evaluation of the right elbow.  She states that she had difficulty getting a therapy appointment so just began physical therapy yesterday however she has little to no pain and is doing much better.  She states she gets an occasional twinge sensation while but otherwise doing very well.  She is here for routine follow-up today.  She denies any numbness tingling distally.  She has no other complaints today.\par \par 08/19/2022 : LEANNA SILVER  is a 67 year year old female who presents to the office for follow-up evaluation of her right elbow today.  She states in general she is feeling much better and has been compliant with her restrictions overall.  She states she still has some swelling and has been working on range of motion on her own and is overall doing much better and has minimal pain.  She states she has some stiffness with flexion and extension but is overall doing well.  She is here for routine follow-up today with her son.  She denies any numbness or tingling distally.  She has no other complaints today.\par \par 08/02/2022 : LEANNA SILVER  is a 66 year year old female who presents to the office for  follow-up valuation of her right elbow.  She states that since last office visit she has had improvement in her pain in her elbow.  She is been compliant with her restrictions in a sling and has avoided any heavy lifting with the right upper extremity.  She is here for routine follow-up today With her son.  She denies any numbness or tingling distally.  She has no other complaints today.\par \par 7/20/22: Leanna is a pleasant 66-year-old right-hand-dominant female who presents to the office today complaining of left shoulder pain for the past 2 to 3 months.  She denies any history of trauma.  Pain was activity related and alleviated by rest.  Hurting her to reach overhead or behind her.  It bothers her when she slept at night.  She saw BLAINE Kelly regarding this issue and she was diagnosed with shoulder impingement.  Conservative treatments were started including anti-inflammatories and physical therapy.  The patient has done very well and has experienced substantial relief over the past several weeks with therapy.  She is now sleeping better at night and she is happy with her progress.  The patient denies any fevers, chills, sweats, recent illnesses, numbness, tingling, weakness, or pain elsewhere at this time.

## 2022-10-13 NOTE — REASON FOR VISIT
[Family Member] : family member [Initial Visit] : an initial visit for [Other: ____] : [unfilled] [FreeTextEntry2] : right elbow f/u [TWNoteComboBox1] : Serbian

## 2022-10-13 NOTE — DISCUSSION/SUMMARY
[de-identified] : Assessment: 67-year-old female with right radial head fracture.\par \par Plan: I had a long discussion with the patient today regarding the nature of their diagnosis and treatment plan. We discussed the risks and benefits of no treatment as well as nonoperative and operative treatments.  I reviewed the patient's x-rays  today which revealed a minimally displaced radial head fracture and overall acceptable position, well healed.  At this time I am recommending that she can progress activities as tolerated and begin strengthening exercises.  She was given a physical therapy prescription to begin strengthening exercises and she can perform strengthening exercises both formally with physical therapy and on her own.  She can progress activities as tolerated we will follow-up in 2 months for repeat evaluation as needed if she has any return her symptoms.  Patient understands and agrees and all questions were answered.  Patient discussed and reviewed with Dr. Roa today.\par \par The patient verbalizes their understanding and agrees with the plan.  All questions were answered to their satisfaction.

## 2022-12-09 ENCOUNTER — NON-APPOINTMENT (OUTPATIENT)
Age: 67
End: 2022-12-09

## 2023-02-06 ENCOUNTER — APPOINTMENT (OUTPATIENT)
Dept: FAMILY MEDICINE | Facility: CLINIC | Age: 68
End: 2023-02-06

## 2024-09-20 NOTE — ED PROVIDER NOTE - DISPOSITION TYPE
Ghada Ferreira  tolerated treatment well with no complications.      Ghada Ferreira is aware of future appt on 11/15/2024 at 09:00 AM.     AVS printed and given to Ghada Ferreira:  Yes      DISCHARGE

## 2024-10-16 ENCOUNTER — OFFICE (OUTPATIENT)
Dept: URBAN - METROPOLITAN AREA CLINIC 112 | Facility: CLINIC | Age: 69
Setting detail: OPHTHALMOLOGY
End: 2024-10-16
Payer: MEDICARE

## 2024-10-16 DIAGNOSIS — H04.121: ICD-10-CM

## 2024-10-16 DIAGNOSIS — H11.153: ICD-10-CM

## 2024-10-16 DIAGNOSIS — H40.013: ICD-10-CM

## 2024-10-16 DIAGNOSIS — H52.03: ICD-10-CM

## 2024-10-16 DIAGNOSIS — H25.13: ICD-10-CM

## 2024-10-16 DIAGNOSIS — H52.4: ICD-10-CM

## 2024-10-16 DIAGNOSIS — H04.553: ICD-10-CM

## 2024-10-16 PROCEDURE — 92014 COMPRE OPH EXAM EST PT 1/>: CPT | Performed by: OPHTHALMOLOGY

## 2024-10-16 PROCEDURE — 92133 CPTRZD OPH DX IMG PST SGM ON: CPT | Performed by: OPHTHALMOLOGY

## 2024-10-16 PROCEDURE — 76514 ECHO EXAM OF EYE THICKNESS: CPT | Performed by: OPHTHALMOLOGY

## 2024-10-16 ASSESSMENT — KERATOMETRY
OD_K1POWER_DIOPTERS: 42.00
OS_K2POWER_DIOPTERS: 43.50
OS_AXISANGLE_DEGREES: 000
OD_AXISANGLE_DEGREES: 010
OD_K2POWER_DIOPTERS: 43.50
OS_K1POWER_DIOPTERS: 42.25

## 2024-10-16 ASSESSMENT — REFRACTION_AUTOREFRACTION
OS_SPHERE: +1.50
OD_SPHERE: +2.00
OD_CYLINDER: -2.00
OS_CYLINDER: -1.50
OD_AXIS: 095
OS_AXIS: 085

## 2024-10-16 ASSESSMENT — REFRACTION_CURRENTRX
OD_OVR_VA: 20/
OS_CYLINDER: -1.25
OS_VPRISM_DIRECTION: BF
OD_CYLINDER: -1.00
OS_VPRISM_DIRECTION: BF
OS_SPHERE: +1.00
OD_VPRISM_DIRECTION: BF
OD_AXIS: 089
OS_SPHERE: +1.00
OD_VPRISM_DIRECTION: BF
OD_SPHERE: +0.75
OS_AXIS: 095
OD_SPHERE: +0.75
OS_AXIS: 094
OD_ADD: +2.00
OD_ADD: +2.00
OS_CYLINDER: -1.25
OS_ADD: +2.00
OD_CYLINDER: -0.75
OS_ADD: +2.00
OS_OVR_VA: 20/
OS_OVR_VA: 20/
OD_OVR_VA: 20/
OD_AXIS: 090

## 2024-10-16 ASSESSMENT — REFRACTION_MANIFEST
OS_CYLINDER: -1.25
OS_AXIS: 090
OD_VA1: 20/25
OS_ADD: +2.00
OD_AXIS: 90
OS_VA2: 20/20
OD_SPHERE: +0.75
OD_ADD: +2.00
OD_VA2: 20/20
OS_SPHERE: +1.00
OS_VA1: 20/25
OD_CYLINDER: -1.00

## 2024-10-16 ASSESSMENT — PACHYMETRY
OD_CT_CORRECTION: 4
OS_CT_CORRECTION: 3
OD_CT_UM: 499
OS_CT_UM: 502

## 2024-10-16 ASSESSMENT — CONFRONTATIONAL VISUAL FIELD TEST (CVF)
OD_FINDINGS: FULL
OS_FINDINGS: FULL

## 2024-10-16 ASSESSMENT — VISUAL ACUITY
OD_BCVA: 20/30
OS_BCVA: 20/30-1

## 2024-10-16 ASSESSMENT — TONOMETRY: OD_IOP_MMHG: 20

## 2024-12-16 NOTE — ED ADULT TRIAGE NOTE - HEIGHT IN CM
[FreeTextEntry1] : Exam Date:      10/13/24 Exam:         MRI LUMBAR SPINE   This exam is compared prior CT scan the abdomen performed on June 29, 2017   Reversal of the normal lumbar lordosis is seen   The vertebral body height appears normal   Disc desiccation is seen involving the T11-12, T12-L1, L2-3, L3-4, L4-5 and L5-S1 level. This is most prominent at the L5-S1 level and secondary chronic degenerative changes   There is increased signal seen involving the L1-2 disc space which could be secondary to demineralization that was present on the prior CT scan of the abdomen/pelvis.   L2 is slightly posterior displaced relative to L3 which likely the result of chronic degenerative changes   T12-L1: Disc bulge is seen which more prominent left side. Far lateral disc herniation is seen on the left side (16-6) mild narrowing spinal canal both neural foramen   L1-2: Bilateral hypertrophic facet joint changes. Mild narrowing of the spinal canal. Mild to moderate narrowing of the left neural foramen and moderate narrowing of the right neural foramen   L2-3: Disc bulge and bilateral hypertrophic facet. Moderate narrowing of the spinal canal. Moderate narrowing of the left neural foramen and severe narrowing of the right neural foramen   L3-4: Disc bulge is seen with associated annular fissure. Bilateral hypertrophic facet joint change. Moderate to severe narrowing of the spinal canal. Moderate narrowing of both neural foramen   L4-5: Disc bulge is seen with associated annular fissure. Bilateral hypertrophic facet changes. Moderate narrowing of the spinal canal. Moderate to severe narrowing of both neural foramen   L5-S1: Disc bulge and tiny central disc protrusion is seen. Bilateral hypertrophic facet joint change. Mild narrowing right neural foramen and moderate narrowing of the left neural foramen   The conus ends at bottom of L1 appears normal.   Elevation of the paraspinal soft tissues appear normal.   Bilateral renal cysts are identified   Small foci of decreased signal is seen involving both iliac bones. These finds correspond to sclerotic areas seen on the prior CT scan and could be compatible bone island though continued close interval can be done to show stability.   IMPRESSION: Degenerative changes as described above.  167.64

## 2025-01-27 ENCOUNTER — OFFICE (OUTPATIENT)
Dept: URBAN - METROPOLITAN AREA CLINIC 115 | Facility: CLINIC | Age: 70
Setting detail: OPHTHALMOLOGY
End: 2025-01-27
Payer: MEDICARE

## 2025-01-27 ENCOUNTER — RX ONLY (RX ONLY)
Age: 70
End: 2025-01-27

## 2025-01-27 DIAGNOSIS — H52.4: ICD-10-CM

## 2025-01-27 DIAGNOSIS — H52.03: ICD-10-CM

## 2025-01-27 DIAGNOSIS — H40.013: ICD-10-CM

## 2025-01-27 DIAGNOSIS — H04.553: ICD-10-CM

## 2025-01-27 DIAGNOSIS — H11.153: ICD-10-CM

## 2025-01-27 DIAGNOSIS — H04.121: ICD-10-CM

## 2025-01-27 DIAGNOSIS — H25.13: ICD-10-CM

## 2025-01-27 PROCEDURE — 92083 EXTENDED VISUAL FIELD XM: CPT | Performed by: OPHTHALMOLOGY

## 2025-01-27 PROCEDURE — 99212 OFFICE O/P EST SF 10 MIN: CPT | Performed by: OPHTHALMOLOGY

## 2025-01-27 PROCEDURE — 92250 FUNDUS PHOTOGRAPHY W/I&R: CPT | Performed by: OPHTHALMOLOGY

## 2025-01-27 ASSESSMENT — REFRACTION_MANIFEST
OD_AXIS: 90
OD_VA1: 20/25
OS_VA1: 20/25
OS_ADD: +2.00
OS_VA2: 20/20
OD_VA2: 20/20
OD_SPHERE: +0.75
OD_ADD: +2.00
OD_CYLINDER: -1.00
OS_CYLINDER: -1.25
OS_AXIS: 090
OS_SPHERE: +1.00

## 2025-01-27 ASSESSMENT — REFRACTION_CURRENTRX
OD_VPRISM_DIRECTION: BF
OS_AXIS: 094
OD_CYLINDER: -0.75
OD_SPHERE: +0.75
OS_ADD: +2.00
OD_ADD: +2.00
OD_ADD: +2.00
OD_VPRISM_DIRECTION: BF
OS_CYLINDER: -1.25
OD_OVR_VA: 20/
OD_AXIS: 089
OS_OVR_VA: 20/
OS_SPHERE: +1.00
OS_VPRISM_DIRECTION: BF
OD_SPHERE: +0.75
OS_SPHERE: +1.00
OD_AXIS: 090
OD_OVR_VA: 20/
OD_CYLINDER: -1.00
OS_CYLINDER: -1.25
OS_AXIS: 095
OS_OVR_VA: 20/
OS_VPRISM_DIRECTION: BF
OS_ADD: +2.00

## 2025-01-27 ASSESSMENT — REFRACTION_AUTOREFRACTION
OD_AXIS: 090
OS_SPHERE: +1.50
OD_SPHERE: +2.00
OS_AXIS: 086
OS_CYLINDER: -1.75
OD_CYLINDER: -1.75

## 2025-01-27 ASSESSMENT — KERATOMETRY
OD_AXISANGLE_DEGREES: 010
OD_K1POWER_DIOPTERS: 42.00
OS_K1POWER_DIOPTERS: 42.25
OD_K2POWER_DIOPTERS: 43.50
OS_AXISANGLE_DEGREES: 000
OS_K2POWER_DIOPTERS: 43.50

## 2025-01-27 ASSESSMENT — PACHYMETRY
OD_CT_CORRECTION: 4
OD_CT_UM: 499
OS_CT_CORRECTION: 3
OS_CT_UM: 502

## 2025-01-27 ASSESSMENT — CONFRONTATIONAL VISUAL FIELD TEST (CVF)
OD_FINDINGS: FULL
OS_FINDINGS: FULL

## 2025-01-27 ASSESSMENT — VISUAL ACUITY
OD_BCVA: 20/30-1
OS_BCVA: 20/30-1

## 2025-01-27 ASSESSMENT — TONOMETRY
OS_IOP_MMHG: 17
OD_IOP_MMHG: 19

## 2025-02-11 ENCOUNTER — OFFICE (OUTPATIENT)
Dept: URBAN - METROPOLITAN AREA CLINIC 94 | Facility: CLINIC | Age: 70
Setting detail: OPHTHALMOLOGY
End: 2025-02-11
Payer: MEDICARE

## 2025-02-11 DIAGNOSIS — H11.153: ICD-10-CM

## 2025-02-11 DIAGNOSIS — H04.121: ICD-10-CM

## 2025-02-11 PROCEDURE — 99212 OFFICE O/P EST SF 10 MIN: CPT | Performed by: OPHTHALMOLOGY

## 2025-02-11 ASSESSMENT — REFRACTION_CURRENTRX
OS_VPRISM_DIRECTION: BF
OD_VPRISM_DIRECTION: BF
OD_ADD: +2.00
OD_VPRISM_DIRECTION: BF
OD_AXIS: 090
OS_VPRISM_DIRECTION: BF
OS_AXIS: 095
OD_ADD: +2.00
OS_SPHERE: +1.00
OS_OVR_VA: 20/
OD_CYLINDER: -1.00
OD_CYLINDER: -0.75
OS_OVR_VA: 20/
OS_CYLINDER: -1.25
OD_SPHERE: +0.75
OS_AXIS: 094
OD_OVR_VA: 20/
OD_AXIS: 089
OD_OVR_VA: 20/
OS_ADD: +2.00
OD_SPHERE: +0.75
OS_ADD: +2.00
OS_CYLINDER: -1.25
OS_SPHERE: +1.00

## 2025-02-11 ASSESSMENT — TONOMETRY
OD_IOP_MMHG: 19
OD_IOP_MMHG: 18
OS_IOP_MMHG: 17
OS_IOP_MMHG: 19

## 2025-02-11 ASSESSMENT — VISUAL ACUITY
OD_BCVA: 20/40
OS_BCVA: 20/40

## 2025-02-11 ASSESSMENT — REFRACTION_MANIFEST
OS_CYLINDER: -1.25
OS_VA1: 20/25
OD_ADD: +2.00
OD_VA2: 20/20
OD_AXIS: 90
OS_VA2: 20/20
OD_CYLINDER: -1.00
OS_SPHERE: +1.00
OD_SPHERE: +0.75
OS_ADD: +2.00
OS_AXIS: 090
OD_VA1: 20/25

## 2025-02-11 ASSESSMENT — REFRACTION_AUTOREFRACTION
OD_CYLINDER: -1.75
OD_SPHERE: +2.00
OS_SPHERE: +1.25
OS_AXIS: 092
OS_CYLINDER: -1.50
OD_AXIS: 096

## 2025-02-11 ASSESSMENT — KERATOMETRY
OD_K1POWER_DIOPTERS: 42.25
OD_K2POWER_DIOPTERS: 43.25
METHOD_AUTO_MANUAL: AUTO
OS_K2POWER_DIOPTERS: 43.50
OS_K1POWER_DIOPTERS: 42.50
OD_AXISANGLE_DEGREES: 013
OS_AXISANGLE_DEGREES: 009

## 2025-02-11 ASSESSMENT — PACHYMETRY
OS_CT_UM: 502
OS_CT_CORRECTION: 3
OD_CT_CORRECTION: 4
OD_CT_UM: 499

## 2025-02-11 ASSESSMENT — CONFRONTATIONAL VISUAL FIELD TEST (CVF)
OS_FINDINGS: FULL
OD_FINDINGS: FULL

## 2025-03-17 NOTE — ED ADULT NURSE NOTE - CHPI ED NUR SYMPTOMS POS
Detail Level: Detailed
Detail Level: Simple
Prescription Strength Graduated Compression Stockings Recommendations: The patient was counseled that prescription strength graduated compression stockings should be worn for all waking hours. They will follow up with a venous specialist to monitor graduated compression stocking usage and their symptoms.
PAIN/STIFFNESS/TENDERNESS